# Patient Record
Sex: MALE | Race: WHITE | NOT HISPANIC OR LATINO | URBAN - NONMETROPOLITAN AREA
[De-identification: names, ages, dates, MRNs, and addresses within clinical notes are randomized per-mention and may not be internally consistent; named-entity substitution may affect disease eponyms.]

---

## 2024-09-11 PROBLEM — R05.1 ACUTE COUGH: Status: ACTIVE | Noted: 2024-09-11

## 2024-11-21 ENCOUNTER — OFFICE VISIT (OUTPATIENT)
Dept: FAMILY MEDICINE CLINIC | Facility: CLINIC | Age: 34
End: 2024-11-21
Payer: MEDICAID

## 2024-11-21 VITALS
RESPIRATION RATE: 16 BRPM | SYSTOLIC BLOOD PRESSURE: 112 MMHG | DIASTOLIC BLOOD PRESSURE: 74 MMHG | OXYGEN SATURATION: 96 % | HEART RATE: 68 BPM | BODY MASS INDEX: 22.79 KG/M2 | HEIGHT: 70 IN | WEIGHT: 159.2 LBS

## 2024-11-21 DIAGNOSIS — Z00.00 ENCOUNTER FOR WELLNESS EXAMINATION IN ADULT: ICD-10-CM

## 2024-11-21 DIAGNOSIS — Z23 NEED FOR TDAP VACCINATION: Primary | ICD-10-CM

## 2024-11-21 DIAGNOSIS — L98.9 SKIN LESION OF SCALP: ICD-10-CM

## 2024-11-21 DIAGNOSIS — Z76.89 ENCOUNTER TO ESTABLISH CARE WITH NEW DOCTOR: ICD-10-CM

## 2024-11-21 DIAGNOSIS — Z00.00 ANNUAL PHYSICAL EXAM: ICD-10-CM

## 2024-11-21 DIAGNOSIS — Z23 FLU VACCINE NEED: ICD-10-CM
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
## 2024-11-22 ENCOUNTER — HOSPITAL ENCOUNTER (OUTPATIENT)
Dept: ULTRASOUND IMAGING | Facility: HOSPITAL | Age: 34
Discharge: HOME | End: 2024-11-22
Payer: MEDICAID

## 2024-11-22 LAB
ALBUMIN SERPL-MCNC: 4.7 G/DL (ref 3.5–5.2)
ALBUMIN/GLOB SERPL: 2.1 G/DL
ALP SERPL-CCNC: 82 U/L (ref 39–117)
ALT SERPL-CCNC: 267 U/L (ref 1–41)
AST SERPL-CCNC: 115 U/L (ref 1–40)
BASOPHILS # BLD AUTO: 0.05 10*3/MM3 (ref 0–0.2)
BASOPHILS NFR BLD AUTO: 1.2 % (ref 0–1.5)
BILIRUB SERPL-MCNC: 0.4 MG/DL (ref 0–1.2)
BUN SERPL-MCNC: 10 MG/DL (ref 6–20)
BUN/CREAT SERPL: 12.8 (ref 7–25)
CALCIUM SERPL-MCNC: 9.3 MG/DL (ref 8.6–10.5)
CHLORIDE SERPL-SCNC: 100 MMOL/L (ref 98–107)
CHOLEST SERPL-MCNC: 232 MG/DL (ref 0–200)
CO2 SERPL-SCNC: 24.9 MMOL/L (ref 22–29)
CREAT SERPL-MCNC: 0.78 MG/DL (ref 0.76–1.27)
EGFRCR SERPLBLD CKD-EPI 2021: 118.5 ML/MIN/1.73
EOSINOPHIL # BLD AUTO: 0.03 10*3/MM3 (ref 0–0.4)
EOSINOPHIL NFR BLD AUTO: 0.7 % (ref 0.3–6.2)
ERYTHROCYTE [DISTWIDTH] IN BLOOD BY AUTOMATED COUNT: 13.5 % (ref 12.3–15.4)
GLOBULIN SER CALC-MCNC: 2.2 GM/DL
GLUCOSE SERPL-MCNC: 79 MG/DL (ref 65–99)
HCT VFR BLD AUTO: 44.8 % (ref 37.5–51)
HCV IGG SERPL QL IA: NON REACTIVE
HDLC SERPL-MCNC: 51 MG/DL (ref 40–60)
HGB BLD-MCNC: 15.1 G/DL (ref 13–17.7)
IMM GRANULOCYTES # BLD AUTO: 0.02 10*3/MM3 (ref 0–0.05)
IMM GRANULOCYTES NFR BLD AUTO: 0.5 % (ref 0–0.5)
LDLC SERPL CALC-MCNC: 143 MG/DL (ref 0–100)
LYMPHOCYTES # BLD AUTO: 1.62 10*3/MM3 (ref 0.7–3.1)
LYMPHOCYTES NFR BLD AUTO: 38.5 % (ref 19.6–45.3)
MCH RBC QN AUTO: 32.2 PG (ref 26.6–33)
MCHC RBC AUTO-ENTMCNC: 33.7 G/DL (ref 31.5–35.7)
MCV RBC AUTO: 95.5 FL (ref 79–97)
MONOCYTES # BLD AUTO: 0.4 10*3/MM3 (ref 0.1–0.9)
MONOCYTES NFR BLD AUTO: 9.5 % (ref 5–12)
NEUTROPHILS # BLD AUTO: 2.09 10*3/MM3 (ref 1.7–7)
NEUTROPHILS NFR BLD AUTO: 49.6 % (ref 42.7–76)
NRBC BLD AUTO-RTO: 0 /100 WBC (ref 0–0.2)
PLATELET # BLD AUTO: 306 10*3/MM3 (ref 140–450)
POTASSIUM SERPL-SCNC: 4.6 MMOL/L (ref 3.5–5.2)
PROT SERPL-MCNC: 6.9 G/DL (ref 6–8.5)
RBC # BLD AUTO: 4.69 10*6/MM3 (ref 4.14–5.8)
SODIUM SERPL-SCNC: 140 MMOL/L (ref 136–145)
TRIGL SERPL-MCNC: 212 MG/DL (ref 0–150)
TSH SERPL DL<=0.005 MIU/L-ACNC: 1.12 UIU/ML (ref 0.27–4.2)
VLDLC SERPL CALC-MCNC: 38 MG/DL (ref 5–40)
WBC # BLD AUTO: 4.21 10*3/MM3 (ref 3.4–10.8)

## 2024-11-22 PROCEDURE — 76536 US EXAM OF HEAD AND NECK: CPT

## 2024-12-02 ENCOUNTER — TELEPHONE (OUTPATIENT)
Dept: FAMILY MEDICINE CLINIC | Facility: CLINIC | Age: 34
End: 2024-12-02

## 2024-12-02 NOTE — TELEPHONE ENCOUNTER
Caller: Kavon Edward    Relationship: Self    Best call back number:       [phone] (Mobile)     What is the medical concern/diagnosis:     FOLLOW UP FROM ULTRA SOUND OF MASS ON PATIENT'S HEAD ON 11/22    What specialty or service is being requested:     DERMATOLOGIST    What is the provider, practice or medical service name:     AS RECOMMENDED BY DR SANTOS    What is the office location:     [place] IF POSSIBLE    PATIENT IS OPEN TO OTHER AREAS IF NEEDED    Any additional details:     PLEASE CONTACT PATIENT WITH ANY UPDATES FOR THE REFERRAL REQUEST

## 2024-12-02 NOTE — TELEPHONE ENCOUNTER
Caller: Kavon Edward    Relationship: Self    Best call back number:       [phone] (Mobile)     What orders are you requesting (i.e. lab or imaging):     PATIENT REQUESTED NEW LAB ORDERS BE PLACED IN THE SYSTEM TO RECHECK FROM PREVIOUS LAB WORK COMPLETED    In what timeframe would the patient need to come in:     PATIENT REQUESTED ORDERS BE PLACED IN (2) WEEKS  SO THAT THERE WILL BE TIME IN BETWEEN TO SEE IF THERE HAS BEEN IMPROVEMENT    Where will you receive your lab/imaging services:     OFFICE    Additional notes:     PATIENT REQUESTED CONTACT WHEN ORDERS HAVE BEEN PLACED IN THE SYSTEM FOR COMPLETION PRIOR TO 12/20 APPOINTMENT WITH DR SANTOS

## 2024-12-03 DIAGNOSIS — R74.01 TRANSAMINITIS: ICD-10-CM

## 2024-12-03 DIAGNOSIS — M79.89 NODULE OF SOFT TISSUE: Primary | ICD-10-CM

## 2024-12-20 ENCOUNTER — OFFICE VISIT (OUTPATIENT)
Dept: FAMILY MEDICINE CLINIC | Facility: CLINIC | Age: 34
End: 2024-12-20
Payer: MEDICAID

## 2024-12-20 VITALS
SYSTOLIC BLOOD PRESSURE: 116 MMHG | OXYGEN SATURATION: 98 % | DIASTOLIC BLOOD PRESSURE: 80 MMHG | WEIGHT: 165.4 LBS | BODY MASS INDEX: 23.68 KG/M2 | RESPIRATION RATE: 16 BRPM | HEART RATE: 80 BPM | HEIGHT: 70 IN

## 2024-12-20 DIAGNOSIS — L98.9 SKIN LESION OF SCALP: ICD-10-CM

## 2024-12-20 DIAGNOSIS — E78.5 ELEVATED FASTING LIPID PROFILE: ICD-10-CM

## 2024-12-20 DIAGNOSIS — R74.01 TRANSAMINITIS: Primary | ICD-10-CM

## 2024-12-20 DIAGNOSIS — F10.10 ENGAGES IN BINGE CONSUMPTION OF ALCOHOL: ICD-10-CM

## 2024-12-20 NOTE — PROGRESS NOTES
"    Follow Up Office Visit      Date: 2024   Patient Name: Kavon Edward  : 1990   MRN: [id number]     Chief Complaint:    Chief Complaint   Patient presents with    Skin Lesion     Scalp lesion, no change    Primary Care Follow-Up     Follow up on lab work        History of Present Illness: Kavon Edward is a 34 y.o. male who is here today for follow-up.    Patient reports he does have appointment in March with dermatology for scalp lesion.  Patient reports no change in this.    We do discuss patient's recent blood work including elevated liver enzymes that have now improved.  Patient reports he does have a history of binge drinking.  Patient reports that he does not drink between these binging episodes but when he drinks he drinks a significant amount.  Patient reports he did have an episode of this prior to labs getting drawn the first time.  Patient reports he is scheduled to see counseling for this in January.    We do discuss patient's elevated cholesterol.    Subjective     I have reviewed the patients family history, social history, past medical history, past surgical history and have updated it as appropriate.     Medications:   No current outpatient medications on file.    Allergies:   No Known Allergies    Objective     Physical Exam:     Vital Signs:   Vitals:    24 1036   BP: 116/80   Pulse: 80   Resp: 16   SpO2: 98%   Weight: 75 kg (165 lb 6.4 oz)   Height: 177.8 cm (70\")     Body mass index is 23.73 kg/m².  BMI is within normal parameters. No other follow-up for BMI required.       Physical Exam     General:  Well appearing adult male in no acute distress. Alert and oriented. Vitals reviewed and are within normal limits.  Head/ENT: Atraumatic.  Neck: Anatomy appears symmetrical.   Cardiac: Patient appears well perfused with regular HR.  Pulmonary: No signs of respiratory distress.  Integumentary/Skin: Skin appears unremarkable from observable skin surfaces.   Neurological: " Normal gait and speech.  Behavioral/Psych: Patient behavior/demeanor appears consistent with reported age. Patient is pleasant with normal affect today.      Procedures    Assessment / Plan      1. Skin lesion of scalp    2. Transaminitis  - Comprehensive metabolic panel; Future    3. Engages in binge consumption of alcohol  - Comprehensive metabolic panel; Future    4. Elevated fasting lipid profile     Patient was previously seen for longstanding lesion of scalp just above his right ear just beneath the skin.  This has been present for roughly 5 years and fluctuates in character.  Soft tissue ultrasound on 11/22/2024 showed a nonspecific 6 mm nodule accounting for palpable abnormality.  This was well  from the bone and did not have appearance of lymph node.  Some consideration based on radiological interpretation noted this could be a granuloma, fluid collection, or even nonspecific solid nodule.  This has not changed in character since previous visit.  Patient does have follow-up with dermatology for possible removal given specific area that this is on head.    Patient was noted to have transaminitis on previous labs on 11/21/2024.  On recheck this has almost completely resolved.  Patient does disclose at visit today that he does have issues with binging alcohol.  Patient reports he does not drink daily but when he does drink, roughly every month or so, he drinks a significant amount.  Patient reports he had drank recently prior to labs.  Patient reports he is seeing counseling for this after the first of the year.  Will recheck labs after first of the year to assess continued improvement/resolution.    Patient was noted to have elevated cholesterol including total and LDL.  Triglycerides were elevated as well.  I did discuss dietary changes with patient.  Patient does inquire whether alcohol can contribute to higher lipid levels.  We did discuss that alcohol can do this.  Patient will continue working  on dietary changes and further cessation of alcohol use.  Will recheck lipid levels in the next few months.    Follow Up:   3 Months.      MD [facility]  [address] [place] [address] [address] [place] [address]  Fax [phone]  Phone [phone]

## 2025-03-25 ENCOUNTER — OFFICE VISIT (OUTPATIENT)
Dept: FAMILY MEDICINE CLINIC | Facility: CLINIC | Age: 35
End: 2025-03-25
Payer: MEDICAID

## 2025-03-25 VITALS
HEIGHT: 70 IN | BODY MASS INDEX: 22.83 KG/M2 | DIASTOLIC BLOOD PRESSURE: 68 MMHG | HEART RATE: 103 BPM | OXYGEN SATURATION: 97 % | WEIGHT: 159.5 LBS | SYSTOLIC BLOOD PRESSURE: 120 MMHG

## 2025-03-25 DIAGNOSIS — R00.0 TACHYCARDIA: ICD-10-CM

## 2025-03-25 DIAGNOSIS — E78.2 MIXED HYPERLIPIDEMIA: Primary | ICD-10-CM

## 2025-03-25 DIAGNOSIS — R74.01 TRANSAMINITIS: ICD-10-CM

## 2025-03-25 DIAGNOSIS — F10.10 ENGAGES IN BINGE CONSUMPTION OF ALCOHOL: ICD-10-CM

## 2025-03-25 NOTE — PROGRESS NOTES
"    Follow Up Office Visit      Date: 2025   Patient Name: Kavon Edward  : 1990   MRN: [id number]     Chief Complaint:    Chief Complaint   Patient presents with    Follow-up     3 month general follow up. Patient was recently by Dermatologist to have a place removed from his head. Patient states that the surgery will take place on 2025.       History of Present Illness: Kavon Edward is a 34 y.o. male who is here today for follow up.     Patient reports that he has seen dermatology and is planned for procedure on 2025 for removal.    Patient reports that he has been doing largely well other wise. Patient reports that he does need to eat healthier and quit drinking as much. Patient reports that he has cut back significantly regarding alcohol and has binge drinking roughly once monthly until this past week when he has had some difficulty. Patient reports that he is seeing a counselor for this in [place], [place] with [facility]. Patient reports that he has been using chewing tobacco/dip more frequently during this time as well.    We do discuss repeat labs today.    Subjective     I have reviewed the patients family history, social history, past medical history, past surgical history and have updated it as appropriate.     Medications:   No current outpatient medications on file.    Allergies:   No Known Allergies    Objective     Physical Exam:     Vital Signs:   Vitals:    25 1142 25 1208   BP: 120/68    Pulse: 114 103   SpO2: 97%    Weight: 72.3 kg (159 lb 8 oz)    Height: 177.8 cm (70\")      Body mass index is 22.89 kg/m².  BMI is within normal parameters. No other follow-up for BMI required.       Physical Exam     General:  Well appearing adult male in no acute distress. Alert and oriented. Vitals reviewed: tachycardic on arrival with improvement on recheck though tachycardia remains  Head/ENT: Atraumatic.   Neck: Anatomy appears symmetrical.   Cardiac: " Tachycardia that improved from intake though still present. Regular rhythm to auscultation.   Pulmonary: Lungs are clear to auscultation bilaterally.  Integumentary/Skin: Skin appears unremarkable from observable skin surfaces.   Neurological: Normal gait and speech.  Behavioral/Psych: Patient behavior/demeanor appears consistent with reported age.     Procedures    Assessment / Plan      1. Engages in binge consumption of alcohol    2. Transaminitis  - Comprehensive metabolic panel; Future    3. Tachycardia    4. Mixed hyperlipidemia  - Lipid panel; Future     Assessment & Plan  1. Alcohol use  - Intermittent episodes of binge alcohol use, worsened over the past week  - Started counseling for alcohol use  - Discussed potential effects: elevated heart rate, transaminitis, long-term effects  - Encouraged to follow up closely with counselor and to keep us informed if struggling    2. Transaminitis  - Noted elevated transaminases, improved since last check  - Recheck planned for July 2025 after more time in counseling    3. Tachycardia  - Noted tachycardia in office today, potentially secondary to increased alcohol use  - Heart rate improved upon recheck, though still elevated    4. Hyperlipidemia  - Previous lipid profile shows hyperlipidemia  - Currently trying to alter with diet/lifestyle changes  - Will reassess with new labs in July       Patient or patient representative verbalized consent for the use of Ambient Listening during the visit with  Trenton Torres MD for chart documentation. 3/26/2025  14:32 EDT     Follow Up:   Return in about 14 weeks (around 7/1/2025).      Trenton Torres MD  [facility]  [address] [place] [address] [address] [place] [phone]  Fax [phone]  Phone [phone]